# Patient Record
Sex: MALE | Race: WHITE | Employment: OTHER | URBAN - METROPOLITAN AREA
[De-identification: names, ages, dates, MRNs, and addresses within clinical notes are randomized per-mention and may not be internally consistent; named-entity substitution may affect disease eponyms.]

---

## 2017-03-15 PROBLEM — K40.90 LEFT INGUINAL HERNIA: Status: ACTIVE | Noted: 2017-03-15

## 2018-07-31 PROBLEM — G43.009 MIGRAINE WITHOUT AURA AND WITHOUT STATUS MIGRAINOSUS, NOT INTRACTABLE: Status: ACTIVE | Noted: 2018-07-31

## 2018-12-17 RX ORDER — SODIUM CHLORIDE 0.9 % (FLUSH) 0.9 %
5-10 SYRINGE (ML) INJECTION AS NEEDED
Status: CANCELLED | OUTPATIENT
Start: 2018-12-17

## 2018-12-17 RX ORDER — SODIUM CHLORIDE 0.9 % (FLUSH) 0.9 %
5-10 SYRINGE (ML) INJECTION EVERY 8 HOURS
Status: CANCELLED | OUTPATIENT
Start: 2018-12-17

## 2019-04-16 RX ORDER — SODIUM CHLORIDE 0.9 % (FLUSH) 0.9 %
5-40 SYRINGE (ML) INJECTION EVERY 8 HOURS
Status: CANCELLED | OUTPATIENT
Start: 2019-04-16

## 2019-04-16 RX ORDER — SODIUM CHLORIDE 0.9 % (FLUSH) 0.9 %
5-40 SYRINGE (ML) INJECTION AS NEEDED
Status: CANCELLED | OUTPATIENT
Start: 2019-04-16

## 2019-04-29 ENCOUNTER — ANESTHESIA EVENT (OUTPATIENT)
Dept: SURGERY | Age: 69
End: 2019-04-29
Payer: MEDICARE

## 2019-04-30 ENCOUNTER — ANESTHESIA (OUTPATIENT)
Dept: SURGERY | Age: 69
End: 2019-04-30
Payer: MEDICARE

## 2019-04-30 ENCOUNTER — HOSPITAL ENCOUNTER (OUTPATIENT)
Age: 69
Setting detail: OUTPATIENT SURGERY
Discharge: HOME | End: 2019-04-30
Attending: SURGERY | Admitting: SURGERY
Payer: MEDICARE

## 2019-04-30 VITALS
HEART RATE: 54 BPM | TEMPERATURE: 98.5 F | RESPIRATION RATE: 18 BRPM | WEIGHT: 147.9 LBS | HEIGHT: 71 IN | OXYGEN SATURATION: 98 % | BODY MASS INDEX: 20.71 KG/M2 | SYSTOLIC BLOOD PRESSURE: 112 MMHG | DIASTOLIC BLOOD PRESSURE: 62 MMHG

## 2019-04-30 DIAGNOSIS — K40.90 LEFT INGUINAL HERNIA: Primary | ICD-10-CM

## 2019-04-30 PROCEDURE — 77030002996 HC SUT SLK J&J -A: Performed by: SURGERY

## 2019-04-30 PROCEDURE — 76210000020 HC REC RM PH II FIRST 0.5 HR: Performed by: SURGERY

## 2019-04-30 PROCEDURE — 76010000162 HC OR TIME 1.5 TO 2 HR INTENSV-TIER 1: Performed by: SURGERY

## 2019-04-30 PROCEDURE — 77030037088 HC TUBE ENDOTRACH ORAL NSL COVD-A: Performed by: NURSE ANESTHETIST, CERTIFIED REGISTERED

## 2019-04-30 PROCEDURE — 77030018836 HC SOL IRR NACL ICUM -A: Performed by: SURGERY

## 2019-04-30 PROCEDURE — 74011000250 HC RX REV CODE- 250

## 2019-04-30 PROCEDURE — 76210000063 HC OR PH I REC FIRST 0.5 HR: Performed by: SURGERY

## 2019-04-30 PROCEDURE — 77030020782 HC GWN BAIR PAWS FLX 3M -B: Performed by: NURSE ANESTHETIST, CERTIFIED REGISTERED

## 2019-04-30 PROCEDURE — 77030039425 HC BLD LARYNG TRULITE DISP TELE -A: Performed by: NURSE ANESTHETIST, CERTIFIED REGISTERED

## 2019-04-30 PROCEDURE — 74011250637 HC RX REV CODE- 250/637: Performed by: ANESTHESIOLOGY

## 2019-04-30 PROCEDURE — 77030002986 HC SUT PROL J&J -A: Performed by: SURGERY

## 2019-04-30 PROCEDURE — 77030008467 HC STPLR SKN COVD -B: Performed by: SURGERY

## 2019-04-30 PROCEDURE — 74011250636 HC RX REV CODE- 250/636: Performed by: ANESTHESIOLOGY

## 2019-04-30 PROCEDURE — 74011250636 HC RX REV CODE- 250/636

## 2019-04-30 PROCEDURE — 74011250636 HC RX REV CODE- 250/636: Performed by: SURGERY

## 2019-04-30 PROCEDURE — 76060000034 HC ANESTHESIA 1.5 TO 2 HR: Performed by: SURGERY

## 2019-04-30 PROCEDURE — 77030012411 HC DRN WND CARD -A: Performed by: SURGERY

## 2019-04-30 PROCEDURE — C1781 MESH (IMPLANTABLE): HCPCS | Performed by: SURGERY

## 2019-04-30 PROCEDURE — 77030031139 HC SUT VCRL2 J&J -A: Performed by: SURGERY

## 2019-04-30 PROCEDURE — 77030033269 HC SLV COMPR SCD KNE2 CARD -B: Performed by: SURGERY

## 2019-04-30 PROCEDURE — 74011000250 HC RX REV CODE- 250: Performed by: SURGERY

## 2019-04-30 DEVICE — MESH HERN M W1.3XL1.6IN SYN POLY-4-HYDROXYBUTYRATE PLUG AND: Type: IMPLANTABLE DEVICE | Site: GROIN | Status: FUNCTIONAL

## 2019-04-30 RX ORDER — SODIUM CHLORIDE, SODIUM LACTATE, POTASSIUM CHLORIDE, CALCIUM CHLORIDE 600; 310; 30; 20 MG/100ML; MG/100ML; MG/100ML; MG/100ML
75 INJECTION, SOLUTION INTRAVENOUS CONTINUOUS
Status: DISCONTINUED | OUTPATIENT
Start: 2019-04-30 | End: 2019-04-30 | Stop reason: HOSPADM

## 2019-04-30 RX ORDER — LIDOCAINE HYDROCHLORIDE 10 MG/ML
0.1 INJECTION INFILTRATION; PERINEURAL AS NEEDED
Status: DISCONTINUED | OUTPATIENT
Start: 2019-04-30 | End: 2019-04-30 | Stop reason: HOSPADM

## 2019-04-30 RX ORDER — NEOSTIGMINE METHYLSULFATE 1 MG/ML
INJECTION INTRAVENOUS AS NEEDED
Status: DISCONTINUED | OUTPATIENT
Start: 2019-04-30 | End: 2019-04-30 | Stop reason: HOSPADM

## 2019-04-30 RX ORDER — ACETAMINOPHEN 500 MG
1000 TABLET ORAL ONCE
Status: COMPLETED | OUTPATIENT
Start: 2019-04-30 | End: 2019-04-30

## 2019-04-30 RX ORDER — DEXAMETHASONE SODIUM PHOSPHATE 4 MG/ML
INJECTION, SOLUTION INTRA-ARTICULAR; INTRALESIONAL; INTRAMUSCULAR; INTRAVENOUS; SOFT TISSUE AS NEEDED
Status: DISCONTINUED | OUTPATIENT
Start: 2019-04-30 | End: 2019-04-30 | Stop reason: HOSPADM

## 2019-04-30 RX ORDER — KETOROLAC TROMETHAMINE 30 MG/ML
INJECTION, SOLUTION INTRAMUSCULAR; INTRAVENOUS AS NEEDED
Status: DISCONTINUED | OUTPATIENT
Start: 2019-04-30 | End: 2019-04-30 | Stop reason: HOSPADM

## 2019-04-30 RX ORDER — NALOXONE HYDROCHLORIDE 0.4 MG/ML
0.2 INJECTION, SOLUTION INTRAMUSCULAR; INTRAVENOUS; SUBCUTANEOUS
Status: DISCONTINUED | OUTPATIENT
Start: 2019-04-30 | End: 2019-04-30 | Stop reason: HOSPADM

## 2019-04-30 RX ORDER — OXYCODONE HYDROCHLORIDE 5 MG/1
5 TABLET ORAL
Status: COMPLETED | OUTPATIENT
Start: 2019-04-30 | End: 2019-04-30

## 2019-04-30 RX ORDER — FENTANYL CITRATE 50 UG/ML
INJECTION, SOLUTION INTRAMUSCULAR; INTRAVENOUS AS NEEDED
Status: DISCONTINUED | OUTPATIENT
Start: 2019-04-30 | End: 2019-04-30 | Stop reason: HOSPADM

## 2019-04-30 RX ORDER — LIDOCAINE HYDROCHLORIDE 20 MG/ML
INJECTION, SOLUTION EPIDURAL; INFILTRATION; INTRACAUDAL; PERINEURAL AS NEEDED
Status: DISCONTINUED | OUTPATIENT
Start: 2019-04-30 | End: 2019-04-30 | Stop reason: HOSPADM

## 2019-04-30 RX ORDER — CEFAZOLIN SODIUM/WATER 2 G/20 ML
2 SYRINGE (ML) INTRAVENOUS ONCE
Status: COMPLETED | OUTPATIENT
Start: 2019-04-30 | End: 2019-04-30

## 2019-04-30 RX ORDER — SODIUM CHLORIDE 0.9 % (FLUSH) 0.9 %
5-40 SYRINGE (ML) INJECTION AS NEEDED
Status: DISCONTINUED | OUTPATIENT
Start: 2019-04-30 | End: 2019-04-30 | Stop reason: HOSPADM

## 2019-04-30 RX ORDER — ROCURONIUM BROMIDE 10 MG/ML
INJECTION, SOLUTION INTRAVENOUS AS NEEDED
Status: DISCONTINUED | OUTPATIENT
Start: 2019-04-30 | End: 2019-04-30 | Stop reason: HOSPADM

## 2019-04-30 RX ORDER — ONDANSETRON 2 MG/ML
INJECTION INTRAMUSCULAR; INTRAVENOUS AS NEEDED
Status: DISCONTINUED | OUTPATIENT
Start: 2019-04-30 | End: 2019-04-30 | Stop reason: HOSPADM

## 2019-04-30 RX ORDER — SODIUM CHLORIDE, SODIUM LACTATE, POTASSIUM CHLORIDE, CALCIUM CHLORIDE 600; 310; 30; 20 MG/100ML; MG/100ML; MG/100ML; MG/100ML
25 INJECTION, SOLUTION INTRAVENOUS CONTINUOUS
Status: DISCONTINUED | OUTPATIENT
Start: 2019-04-30 | End: 2019-04-30 | Stop reason: HOSPADM

## 2019-04-30 RX ORDER — MIDAZOLAM HYDROCHLORIDE 1 MG/ML
2 INJECTION, SOLUTION INTRAMUSCULAR; INTRAVENOUS ONCE
Status: DISCONTINUED | OUTPATIENT
Start: 2019-04-30 | End: 2019-04-30 | Stop reason: HOSPADM

## 2019-04-30 RX ORDER — EPHEDRINE SULFATE 50 MG/ML
INJECTION, SOLUTION INTRAVENOUS AS NEEDED
Status: DISCONTINUED | OUTPATIENT
Start: 2019-04-30 | End: 2019-04-30 | Stop reason: HOSPADM

## 2019-04-30 RX ORDER — SODIUM CHLORIDE 0.9 % (FLUSH) 0.9 %
5-40 SYRINGE (ML) INJECTION EVERY 8 HOURS
Status: DISCONTINUED | OUTPATIENT
Start: 2019-04-30 | End: 2019-04-30 | Stop reason: HOSPADM

## 2019-04-30 RX ORDER — HYDROMORPHONE HYDROCHLORIDE 2 MG/ML
0.5 INJECTION, SOLUTION INTRAMUSCULAR; INTRAVENOUS; SUBCUTANEOUS
Status: DISCONTINUED | OUTPATIENT
Start: 2019-04-30 | End: 2019-04-30 | Stop reason: HOSPADM

## 2019-04-30 RX ORDER — FENTANYL CITRATE 50 UG/ML
100 INJECTION, SOLUTION INTRAMUSCULAR; INTRAVENOUS ONCE
Status: DISCONTINUED | OUTPATIENT
Start: 2019-04-30 | End: 2019-04-30 | Stop reason: HOSPADM

## 2019-04-30 RX ORDER — PROPOFOL 10 MG/ML
INJECTION, EMULSION INTRAVENOUS AS NEEDED
Status: DISCONTINUED | OUTPATIENT
Start: 2019-04-30 | End: 2019-04-30 | Stop reason: HOSPADM

## 2019-04-30 RX ORDER — MIDAZOLAM HYDROCHLORIDE 1 MG/ML
2 INJECTION, SOLUTION INTRAMUSCULAR; INTRAVENOUS
Status: DISCONTINUED | OUTPATIENT
Start: 2019-04-30 | End: 2019-04-30 | Stop reason: HOSPADM

## 2019-04-30 RX ORDER — ONDANSETRON 2 MG/ML
4 INJECTION INTRAMUSCULAR; INTRAVENOUS
Status: DISCONTINUED | OUTPATIENT
Start: 2019-04-30 | End: 2019-04-30 | Stop reason: HOSPADM

## 2019-04-30 RX ORDER — HYDROCODONE BITARTRATE AND ACETAMINOPHEN 5; 325 MG/1; MG/1
1 TABLET ORAL
Qty: 25 TAB | Refills: 0 | Status: SHIPPED | OUTPATIENT
Start: 2019-04-30 | End: 2019-05-03

## 2019-04-30 RX ORDER — BUPIVACAINE HYDROCHLORIDE AND EPINEPHRINE 5; 5 MG/ML; UG/ML
INJECTION, SOLUTION EPIDURAL; INTRACAUDAL; PERINEURAL AS NEEDED
Status: DISCONTINUED | OUTPATIENT
Start: 2019-04-30 | End: 2019-04-30 | Stop reason: HOSPADM

## 2019-04-30 RX ORDER — GLYCOPYRROLATE 0.2 MG/ML
INJECTION INTRAMUSCULAR; INTRAVENOUS AS NEEDED
Status: DISCONTINUED | OUTPATIENT
Start: 2019-04-30 | End: 2019-04-30 | Stop reason: HOSPADM

## 2019-04-30 RX ORDER — HYDROCODONE BITARTRATE AND ACETAMINOPHEN 5; 325 MG/1; MG/1
1 TABLET ORAL
Qty: 40 TAB | Refills: 0 | Status: SHIPPED | OUTPATIENT
Start: 2019-04-30 | End: 2019-04-30 | Stop reason: SDUPTHER

## 2019-04-30 RX ORDER — NALOXONE HYDROCHLORIDE 0.4 MG/ML
0.2 INJECTION, SOLUTION INTRAMUSCULAR; INTRAVENOUS; SUBCUTANEOUS AS NEEDED
Status: DISCONTINUED | OUTPATIENT
Start: 2019-04-30 | End: 2019-04-30 | Stop reason: HOSPADM

## 2019-04-30 RX ADMIN — NEOSTIGMINE METHYLSULFATE 3 MG: 1 INJECTION INTRAVENOUS at 13:51

## 2019-04-30 RX ADMIN — SODIUM CHLORIDE, SODIUM LACTATE, POTASSIUM CHLORIDE, AND CALCIUM CHLORIDE: 600; 310; 30; 20 INJECTION, SOLUTION INTRAVENOUS at 12:50

## 2019-04-30 RX ADMIN — ACETAMINOPHEN 1000 MG: 500 TABLET, FILM COATED ORAL at 11:04

## 2019-04-30 RX ADMIN — DEXAMETHASONE SODIUM PHOSPHATE 4 MG: 4 INJECTION, SOLUTION INTRA-ARTICULAR; INTRALESIONAL; INTRAMUSCULAR; INTRAVENOUS; SOFT TISSUE at 13:13

## 2019-04-30 RX ADMIN — PROPOFOL 130 MG: 10 INJECTION, EMULSION INTRAVENOUS at 12:51

## 2019-04-30 RX ADMIN — OXYCODONE HYDROCHLORIDE 5 MG: 5 TABLET ORAL at 14:52

## 2019-04-30 RX ADMIN — KETOROLAC TROMETHAMINE 15 MG: 30 INJECTION, SOLUTION INTRAMUSCULAR; INTRAVENOUS at 13:50

## 2019-04-30 RX ADMIN — Medication 2 G: at 12:58

## 2019-04-30 RX ADMIN — SODIUM CHLORIDE, SODIUM LACTATE, POTASSIUM CHLORIDE, AND CALCIUM CHLORIDE 25 ML/HR: 600; 310; 30; 20 INJECTION, SOLUTION INTRAVENOUS at 11:05

## 2019-04-30 RX ADMIN — ONDANSETRON 4 MG: 2 INJECTION INTRAMUSCULAR; INTRAVENOUS at 13:13

## 2019-04-30 RX ADMIN — GLYCOPYRROLATE 0.4 MG: 0.2 INJECTION INTRAMUSCULAR; INTRAVENOUS at 13:51

## 2019-04-30 RX ADMIN — EPHEDRINE SULFATE 10 MG: 50 INJECTION, SOLUTION INTRAVENOUS at 13:37

## 2019-04-30 RX ADMIN — ROCURONIUM BROMIDE 50 MG: 10 INJECTION, SOLUTION INTRAVENOUS at 12:52

## 2019-04-30 RX ADMIN — EPHEDRINE SULFATE 10 MG: 50 INJECTION, SOLUTION INTRAVENOUS at 13:25

## 2019-04-30 RX ADMIN — LIDOCAINE HYDROCHLORIDE 80 MG: 20 INJECTION, SOLUTION EPIDURAL; INFILTRATION; INTRACAUDAL; PERINEURAL at 12:51

## 2019-04-30 RX ADMIN — FENTANYL CITRATE 50 MCG: 50 INJECTION, SOLUTION INTRAMUSCULAR; INTRAVENOUS at 12:50

## 2019-04-30 NOTE — ANESTHESIA PREPROCEDURE EVALUATION
Relevant Problems No relevant active problems Anesthetic History No history of anesthetic complications Review of Systems / Medical History Patient summary reviewed and pertinent labs reviewed Pulmonary Asthma : well controlled Neuro/Psych Headaches (Migraines) Cardiovascular Exercise tolerance: >4 METS Comments: Denies CP, SOB or changes in functional status GI/Hepatic/Renal 
Within defined limits Endo/Other Within defined limits Other Findings Physical Exam 
 
Airway Mallampati: II 
TM Distance: 4 - 6 cm Neck ROM: normal range of motion Mouth opening: Normal 
 
 Cardiovascular Rhythm: regular Rate: normal 
 
 
 
 Dental 
No notable dental hx Pulmonary Breath sounds clear to auscultation Abdominal 
GI exam deferred Other Findings Anesthetic Plan ASA: 2 Anesthesia type: general 
 
 
 
 
Induction: Intravenous Anesthetic plan and risks discussed with: Patient

## 2019-04-30 NOTE — ANESTHESIA POSTPROCEDURE EVALUATION
Procedure(s): HERNIA INGUINAL REPAIR LEFT WITH MESH. general 
 
Anesthesia Post Evaluation Multimodal analgesia: multimodal analgesia used between 6 hours prior to anesthesia start to PACU discharge Patient location during evaluation: bedside Patient participation: complete - patient participated Level of consciousness: awake and alert Pain score: 1 Pain management: adequate Airway patency: patent Anesthetic complications: no 
Cardiovascular status: acceptable Respiratory status: acceptable Hydration status: acceptable Comments: Pt doing well. Ok to d/c home. Post anesthesia nausea and vomiting:  none Vitals Value Taken Time /56 4/30/2019  2:51 PM  
Temp 36.6 °C (97.8 °F) 4/30/2019  2:23 PM  
Pulse 46 4/30/2019  2:51 PM  
Resp 18 4/30/2019  2:46 PM  
SpO2 97 % 4/30/2019  2:51 PM

## 2019-04-30 NOTE — H&P
Surgery History and Physical    Subjective:      Patricia Koehler is a 78 y.o. male who presents for An open left inguinal hernia repair with mesh. Past Medical History:   Diagnosis Date    Asthma     as a child    Migraine without aura and without status migrainosus, not intractable 2018     Past Surgical History:   Procedure Laterality Date    HX COLONOSCOPY      HX OTHER SURGICAL      L Hydrocelectomy        Family History   Problem Relation Age of Onset    Cancer Mother         Lung    Heart Disease Mother     Diabetes Father     Heart Disease Father     Hypertension Father     Diabetes Brother     Stroke Brother      Social History     Tobacco Use    Smoking status: Never Smoker    Smokeless tobacco: Never Used   Substance Use Topics    Alcohol use: No      Prior to Admission medications    Medication Sig Start Date End Date Taking? Authorizing Provider   SUMAtriptan (IMITREX) 100 mg tablet TAKE ONE TABLET AT ONSET OF HEADACHE, MAY REPEAT IN 2 HOURS IF NO BETTER. MAX 2 TABLETS EVERY 24 HOURS (FOR MIGRAINE) 19   Brothers, Olive LEAL, DO      No Known Allergies    Review of Systems   All other systems reviewed and are negative. Objective:     Patient Vitals for the past 8 hrs:   BP Temp Pulse Resp SpO2 Height Weight   19 1103 141/69 98.3 °F (36.8 °C) (!) 52 16 99 % -- --   19 1053 -- -- -- -- -- 5' 11\" (1.803 m) 147 lb 14.4 oz (67.1 kg)       Temp (24hrs), Av.3 °F (36.8 °C), Min:98.3 °F (36.8 °C), Max:98.3 °F (36.8 °C)      Physical Exam   Constitutional: He is oriented to person, place, and time. He appears well-developed and well-nourished. No distress. HENT:   Head: Normocephalic and atraumatic. Eyes: Conjunctivae and EOM are normal. Pupils are equal, round, and reactive to light. Neck: Normal range of motion. Neck supple. Cardiovascular: Normal rate, regular rhythm and normal heart sounds.    Pulmonary/Chest: Effort normal and breath sounds normal. No respiratory distress. He has no wheezes. Abdominal: Soft. Bowel sounds are normal. He exhibits no distension and no mass. There is no tenderness. There is no rebound and no guarding. +[facility]   Musculoskeletal: Normal range of motion. Neurological: He is alert and oriented to person, place, and time. Skin: Skin is warm and dry. He is not diaphoretic. Psychiatric: He has a normal mood and affect. His behavior is normal. Judgment and thought content normal.       Assessment:     [facility]    Plan:     Discussed the risk of surgery including but not limited to Bleeding, infection, hematoma, nerve injury, recurrence,  and the risks of general anesthetic. The patient understands the risks; any and all questions were answered to the patient's satisfaction. Proceed with open left inguinal hernia repair with mesh.

## 2019-04-30 NOTE — BRIEF OP NOTE
BRIEF OPERATIVE NOTE    Date of Procedure: 4/30/2019   Preoperative Diagnosis: Left inguinal hernia [K40.90]  Postoperative Diagnosis: Left inguinal hernia [K40.90]    Procedure(s): HERNIA INGUINAL REPAIR LEFT WITH MESH  Surgeon(s) and Role:     * Duane Brody MD - Primary         Surgical Assistant: GURERERO Parker      Surgical Staff:  Collis BanksReginold Cooks, RN  Physician Assistant: GUERRERO Oliver  Scrub Tech-1: [employer]  Event Time In Time Out   Incision Start 1306    Incision Close 1353      Anesthesia: General   Estimated Blood Loss: minimal  Specimens: * No specimens in log *   Findings: see operative report   Complications: none  Implants:   Implant Name Type Inv.  Item Serial No.  Lot No. LRB No. Used Action   MESH RO PLUG/PTCH MED -- PHASIX - [id number]  MESH RO PLUG/PTCH MED -- PHASIX   DWAYNEPAPO [id number] 1 Implanted

## 2019-04-30 NOTE — DISCHARGE INSTRUCTIONS
Discharge Instructions/Follow-up Plans:   MD Instructions:     Follow-up with Dottie Valdes, Physician Assistant for Dr. Juan Bocanegra in 1 week. Keep incisions clean and dry, may remain uncovered. Do not apply lotions, creams or ointments to incisions.     Diet - as tolerated - Soft foods diet. Activity - ambulate - as tolerated - no heavy lifting >10lb. May shower - no tub baths or soaking/submerging.     No driving while taking narcotics. Do not drink alcohol while taking narcotics. Resume other home medications.      If problems or questions arise, please call our office at [phone].     After general anesthesia or intravenous sedation, for 24 hours or while taking prescription Narcotics:  · Limit your activities  · A responsible adult needs to be with you for the next 24 hours  · Do not drive and operate hazardous machinery  · Do not make important personal or business decisions  · Do  not drink alcoholic beverages  · If you have not urinated within 8 hours after discharge, please contact your surgeon on call. *  Please give a list of your current medications to your Primary Care Provider. *  Please update this list whenever your medications are discontinued, doses are      changed, or new medications (including over-the-counter products) are added. *  Please carry medication information at all times in case of emergency situations. These are general instructions for a healthy lifestyle:  No smoking/ No tobacco products/ Avoid exposure to second hand smoke  Surgeon General's Warning:  Quitting smoking now greatly reduces serious risk to your health.   Obesity, smoking, and sedentary lifestyle greatly increases your risk for illness  A healthy diet, regular physical exercise & weight monitoring are important for maintaining a healthy lifestyle    You may be retaining fluid if you have a history of heart failure or if you experience any of the following symptoms:  Weight gain of 3 pounds or more overnight or 5 pounds in a week, increased swelling in our hands or feet or shortness of breath while lying flat in bed. Please call your doctor as soon as you notice any of these symptoms; do not wait until your next office visit. Recognize signs and symptoms of STROKE:  F-face looks uneven  A-arms unable to move or move unevenly  S-speech slurred or non-existent  T-time-call 911 as soon as signs and symptoms begin-DO NOT go       Back to bed or wait to see if you get better-TIME IS BRAIN.

## 2019-05-01 NOTE — OP NOTES
[facility]  OPERATIVE REPORT    Name:  Richard Lezama  MR#:  [id number]  :  1950  ACCOUNT #:  [de-identified]  DATE OF SERVICE:  2019      PREOPERATIVE DIAGNOSIS:  Left inguinal hernia. POSTOPERATIVE DIAGNOSIS:  Left indirect inguinal hernia. PROCEDURE PERFORMED:  Left inguinal hernia repair with mesh. SURGEON:  Olive Cárdenas MD    ASSISTANT:  GUERRERO Dutta    ANESTHESIA:  General.    COMPLICATIONS:  None. COUNTS:  Correct. SPECIMENS REMOVED:  None. IMPLANTS:  One Phasix bioabsorbable plug and bioabsorbable patch overlay. ESTIMATED BLOOD LOSS:  Minimal.    DESCRIPTION OF PROCEDURE:  After the patient was asleep, the left groin incision was made. Dissection carried down through Adalberto's fascia. External oblique aponeurosis opened in direction with its fibers. The iliac nerve was identified and spared. The cord structures were isolated. Indirect inguinal hernia was visualized and dissected free. This was reduced. A [place]ridge bioabsorbable plug was soaked in 0.5% Marcaine with Epinephrine and inserted into the defect. The fronds were trimmed and interrupted #0 Prolene was used to secure this in place very loosely. Phasix bioabsorbable plug and bioabsorbable patch overlay were then placed. This was wrapped around the cord and secured in place with interrupted #0 Prolene; #0 Prolene used to close the external oblique aponeurosis. A 3-0 Vicryl was used to close subcutaneous tissue. Skin was closed with staples. The patient tolerated the procedure well.       Ann Lutz MD      [email or web address]  D:  2019 13:51  T:  2019 19:01  JOB #:  [id number]

## 2022-03-19 PROBLEM — G43.009 MIGRAINE WITHOUT AURA AND WITHOUT STATUS MIGRAINOSUS, NOT INTRACTABLE: Status: ACTIVE | Noted: 2018-07-31

## 2022-03-20 PROBLEM — K40.90 LEFT INGUINAL HERNIA: Status: ACTIVE | Noted: 2017-03-15

## 2022-11-29 DIAGNOSIS — Z00.00 ENCOUNTER FOR GENERAL ADULT MEDICAL EXAMINATION WITHOUT ABNORMAL FINDINGS: ICD-10-CM

## 2022-11-29 DIAGNOSIS — Z12.5 ENCOUNTER FOR SCREENING FOR MALIGNANT NEOPLASM OF PROSTATE: Primary | ICD-10-CM

## 2022-11-29 DIAGNOSIS — Z13.220 ENCOUNTER FOR SCREENING FOR LIPOID DISORDERS: ICD-10-CM

## 2022-12-16 ENCOUNTER — OFFICE VISIT (OUTPATIENT)
Dept: INTERNAL MEDICINE CLINIC | Facility: CLINIC | Age: 72
End: 2022-12-16

## 2022-12-16 VITALS
HEART RATE: 56 BPM | BODY MASS INDEX: 20.44 KG/M2 | HEIGHT: 71 IN | DIASTOLIC BLOOD PRESSURE: 70 MMHG | WEIGHT: 146 LBS | OXYGEN SATURATION: 100 % | SYSTOLIC BLOOD PRESSURE: 112 MMHG | RESPIRATION RATE: 17 BRPM

## 2022-12-16 DIAGNOSIS — Z12.5 ENCOUNTER FOR SCREENING FOR MALIGNANT NEOPLASM OF PROSTATE: ICD-10-CM

## 2022-12-16 DIAGNOSIS — Z13.220 ENCOUNTER FOR SCREENING FOR LIPOID DISORDERS: ICD-10-CM

## 2022-12-16 DIAGNOSIS — R39.14 BENIGN PROSTATIC HYPERPLASIA WITH INCOMPLETE BLADDER EMPTYING: ICD-10-CM

## 2022-12-16 DIAGNOSIS — Z00.00 MEDICARE ANNUAL WELLNESS VISIT, SUBSEQUENT: ICD-10-CM

## 2022-12-16 DIAGNOSIS — N40.1 BENIGN PROSTATIC HYPERPLASIA WITH INCOMPLETE BLADDER EMPTYING: ICD-10-CM

## 2022-12-16 DIAGNOSIS — K59.01 SLOW TRANSIT CONSTIPATION: Primary | ICD-10-CM

## 2022-12-16 DIAGNOSIS — G43.009 MIGRAINE WITHOUT AURA AND WITHOUT STATUS MIGRAINOSUS, NOT INTRACTABLE: ICD-10-CM

## 2022-12-16 LAB
ALBUMIN SERPL-MCNC: 3.8 G/DL (ref 3.2–4.6)
ALBUMIN/GLOB SERPL: 1.5 {RATIO} (ref 0.4–1.6)
ALP SERPL-CCNC: 57 U/L (ref 50–136)
ALT SERPL-CCNC: 37 U/L (ref 12–65)
ANION GAP SERPL CALC-SCNC: 2 MMOL/L (ref 2–11)
AST SERPL-CCNC: 18 U/L (ref 15–37)
BILIRUB SERPL-MCNC: 0.5 MG/DL (ref 0.2–1.1)
BUN SERPL-MCNC: 23 MG/DL (ref 8–23)
CALCIUM SERPL-MCNC: 9.4 MG/DL (ref 8.3–10.4)
CHLORIDE SERPL-SCNC: 107 MMOL/L (ref 101–110)
CHOLEST SERPL-MCNC: 161 MG/DL
CO2 SERPL-SCNC: 29 MMOL/L (ref 21–32)
CREAT SERPL-MCNC: 1 MG/DL (ref 0.8–1.5)
ERYTHROCYTE [DISTWIDTH] IN BLOOD BY AUTOMATED COUNT: 12.8 % (ref 11.9–14.6)
GLOBULIN SER CALC-MCNC: 2.6 G/DL (ref 2.8–4.5)
GLUCOSE SERPL-MCNC: 105 MG/DL (ref 65–100)
HCT VFR BLD AUTO: 46.8 % (ref 41.1–50.3)
HDLC SERPL-MCNC: 47 MG/DL (ref 40–60)
HDLC SERPL: 3.4 {RATIO}
HGB BLD-MCNC: 15.5 G/DL (ref 13.6–17.2)
LDLC SERPL CALC-MCNC: 102.8 MG/DL
MCH RBC QN AUTO: 30.2 PG (ref 26.1–32.9)
MCHC RBC AUTO-ENTMCNC: 33.1 G/DL (ref 31.4–35)
MCV RBC AUTO: 91.2 FL (ref 82–102)
NRBC # BLD: 0 K/UL (ref 0–0.2)
PLATELET # BLD AUTO: 305 K/UL (ref 150–450)
PMV BLD AUTO: 9.1 FL (ref 9.4–12.3)
POTASSIUM SERPL-SCNC: 4.5 MMOL/L (ref 3.5–5.1)
PROT SERPL-MCNC: 6.4 G/DL (ref 6.3–8.2)
RBC # BLD AUTO: 5.13 M/UL (ref 4.23–5.6)
SODIUM SERPL-SCNC: 138 MMOL/L (ref 133–143)
TRIGL SERPL-MCNC: 56 MG/DL (ref 35–150)
VLDLC SERPL CALC-MCNC: 11.2 MG/DL (ref 6–23)
WBC # BLD AUTO: 8 K/UL (ref 4.3–11.1)

## 2022-12-16 RX ORDER — SUMATRIPTAN 100 MG/1
TABLET, FILM COATED ORAL
Qty: 27 TABLET | Refills: 3 | Status: SHIPPED | OUTPATIENT
Start: 2022-12-16

## 2022-12-16 RX ORDER — TAMSULOSIN HYDROCHLORIDE 0.4 MG/1
0.4 CAPSULE ORAL DAILY
Qty: 90 CAPSULE | Refills: 3 | Status: SHIPPED | OUTPATIENT
Start: 2022-12-16

## 2022-12-16 ASSESSMENT — PATIENT HEALTH QUESTIONNAIRE - PHQ9
SUM OF ALL RESPONSES TO PHQ QUESTIONS 1-9: 0
1. LITTLE INTEREST OR PLEASURE IN DOING THINGS: 0
2. FEELING DOWN, DEPRESSED OR HOPELESS: 0
SUM OF ALL RESPONSES TO PHQ QUESTIONS 1-9: 0
SUM OF ALL RESPONSES TO PHQ9 QUESTIONS 1 & 2: 0
SUM OF ALL RESPONSES TO PHQ QUESTIONS 1-9: 0
SUM OF ALL RESPONSES TO PHQ QUESTIONS 1-9: 0

## 2022-12-16 ASSESSMENT — LIFESTYLE VARIABLES
HOW OFTEN DO YOU HAVE A DRINK CONTAINING ALCOHOL: NEVER
HOW MANY STANDARD DRINKS CONTAINING ALCOHOL DO YOU HAVE ON A TYPICAL DAY: PATIENT DOES NOT DRINK

## 2022-12-16 NOTE — PROGRESS NOTES
Medicare Annual Wellness Visit    Amber Lemus is here for Medicare AWV    Assessment & Plan   Medicare annual wellness visit, subsequent      Recommendations for Preventive Services Due: see orders and patient instructions/AVS.  Recommended screening schedule for the next 5-10 years is provided to the patient in written form: see Patient Instructions/AVS.     Return for Medicare Annual Wellness Visit in 1 year. Patient's complete Health Risk Assessment and screening values have been reviewed and are found in Flowsheets. The following problems were reviewed today and where indicated follow up appointments were made and/or referrals ordered. Positive Risk Factor Screenings with Interventions:                 Weight and Activity:  Physical Activity: Inactive    Days of Exercise per Week: 0 days    Minutes of Exercise per Session: 0 min     On average, how many days per week do you engage in moderate to strenuous exercise (like a brisk walk)?: 0 days  Have you lost any weight without trying in the past 3 months?: No  Body mass index: 20.36    Encouraged healthy eating and exercise. Dentist Screen:  Have you seen the dentist within the past year?: (!) No    Intervention:  Encouraged dental follow up. Safety:  Do you have either shower bars, grab bars, non-slip mats or non-slip surfaces in your shower or bathtub?: (!) No  Interventions:  Appropriate safety interventions as indicated. Advanced Directives:  Do you have a Living Will?: (!) No    Encouraged living will planning            Objective   Vitals:    12/16/22 0958   BP: 112/70   Site: Left Upper Arm   Position: Sitting   Pulse: 56   Resp: 17   SpO2: 100%   Weight: 146 lb (66.2 kg)   Height: 5' 11\" (1.803 m)      Body mass index is 20.36 kg/m². No Known Allergies  Prior to Visit Medications    Medication Sig Taking?  Authorizing Provider   SUMAtriptan (IMITREX) 100 MG tablet Take one at onset of HA, may repeat in 2hrs if no better. Max 2tabs/24hrs.  Indications: a migraine headache Yes Nabil Nava DO   tamsulosin (FLOMAX) 0.4 MG capsule Take 1 capsule by mouth daily Yes Marlene Jeffrey DO       CareTeam (Including outside providers/suppliers regularly involved in providing care):   Patient Care Team:  Marlene Jeffrey DO as PCP - General  Marlene Jeffrey DO as PCP - [facility] Empaneled Provider     Reviewed and updated this visit:  Tobacco  Allergies  Meds  Problems  Med Hx  Surg Hx  Soc Hx  Fam Hx

## 2022-12-16 NOTE — PATIENT INSTRUCTIONS
Learning About Being Active as an Older Adult  Why is being active important as you get older? Being active is one of the best things you can do for your health. And it's never too late to start. Being active--or getting active, if you aren't already--has definite benefits. It can:  Give you more energy,  Keep your mind sharp. Improve balance to reduce your risk of falls. Help you manage chronic illness with fewer medicines. No matter how old you are, how fit you are, or what health problems you have, there is a form of activity that will work for you. And the more physical activity you can do, the better your overall health will be. What kinds of activity can help you stay healthy? Being more active will make your daily activities easier. Physical activity includes planned exercise and things you do in daily life. There are four types of activity:  Aerobic. Doing aerobic activity makes your heart and lungs strong. Includes walking, dancing, and gardening. Aim for at least 2½ hours spread throughout the week. It improves your energy and can help you sleep better. Muscle-strengthening. This type of activity can help maintain muscle and strengthen bones. Includes climbing stairs, using resistance bands, and lifting or carrying heavy loads. Aim for at least twice a week. It can help protect the knees and other joints. Stretching. Stretching gives you better range of motion in joints and muscles. Includes upper arm stretches, calf stretches, and gentle yoga. Aim for at least twice a week, preferably after your muscles are warmed up from other activities. It can help you function better in daily life. Balancing. This helps you stay coordinated and have good posture. Includes heel-to-toe walking, sherlyn chi, and certain types of yoga. Aim for at least 3 days a week. It can reduce your risk of falling.   Even if you have a hard time meeting the recommendations, it's better to be more active than less active. All activity done in each category counts toward your weekly total. You'd be surprised how daily things like carrying groceries, keeping up with grandchildren, and taking the stairs can add up. What keeps you from being active? If you've had a hard time being more active, you're not alone. Maybe you remember being able to do more. Or maybe you've never thought of yourself as being active. It's frustrating when you can't do the things you want. Being more active can help. What's holding you back? Getting started. Have a goal, but break it into easy tasks. Small steps build into big accomplishments. Staying motivated. If you feel like skipping your activity, remember your goal. Maybe you want to move better and stay independent. Every activity gets you one step closer. Not feeling your best.  Start with 5 minutes of an activity you enjoy. Prove to yourself you can do it. As you get comfortable, increase your time. You may not be where you want to be. But you're in the process of getting there. Everyone starts somewhere. How can you find safe ways to stay active? Talk with your doctor about any physical challenges you're facing. Make a plan with your doctor if you have a health problem or aren't sure how to get started with activity. If you're already active, ask your doctor if there is anything you should change to stay safe as your body and health change. If you tend to feel dizzy after you take medicine, avoid activity at that time. Try being active before you take your medicine. This will reduce your risk of falls. If you plan to be active at home, make sure to clear your space before you get started. Remove things like TV cords, coffee tables, and throw rugs. It's safest to have plenty of space to move freely. The key to getting more active is to take it slow and steady. Try to improve only a little bit at a time.  Pick just one area to improve on at first. And if an activity hurts, stop and talk to your doctor. Where can you learn more? Go to http://www.nayak.com/ and enter P600 to learn more about \"Learning About Being Active as an Older Adult. \"  Current as of: October 10, 2022               Content Version: 13.5  © 9411-7067 Healthwise, Incorporated. Care instructions adapted under license by [facility]. If you have questions about a medical condition or this instruction, always ask your healthcare professional. [place] [address] any warranty or liability for your use of this information. Learning About Dental Care for Older Adults  Dental care for older adults: Overview  Dental care for older people is much the same as for younger adults. But older adults do have concerns that younger adults do not. Older adults may have problems with gum disease and decay on the roots of their teeth. They may need missing teeth replaced or broken fillings fixed. Or they may have dentures that need to be cared for. Some older adults may have trouble holding a toothbrush. You can help remind the person you are caring for to brush and floss their teeth or to clean their dentures. In some cases, you may need to do the brushing and other dental care tasks. People who have trouble using their hands or who have dementia may need this extra help. How can you help with dental care? Normal dental care  To keep the teeth and gums healthy:  Brush the teeth with fluoride toothpaste twice a day--in the morning and at night--and floss at least once a day. Plaque can quickly build up on the teeth of older adults. Watch for the signs of gum disease. These signs include gums that bleed after brushing or after eating hard foods, such as apples. See a dentist regularly. Many experts recommend checkups every 6 months. Keep the dentist up to date on any new medications the person is taking.   Encourage a balanced diet that includes whole grains, vegetables, and fruits, and that is low in saturated fat and sodium. Encourage the person you're caring for not to use tobacco products. They can affect dental and general health. Many older adults have a fixed income and feel that they can't afford dental care. But most towns and [facility] have programs in which dentists help older adults by lowering fees. Contact your area's public health offices or  for information about dental care in your area. Using a toothbrush  Older adults with arthritis sometimes have trouble brushing their teeth because they can't easily hold the toothbrush. Their hands and fingers may be stiff, painful, or weak. If this is the case, you can: Offer an electric toothbrush. Enlarge the handle of a non-electric toothbrush by wrapping a sponge, an elastic bandage, or adhesive tape around it. Push the toothbrush handle through a ball made of rubber or soft foam.  Make the handle longer and thicker by taping Popsicle sticks or tongue depressors to it. You may also be able to buy special toothbrushes, toothpaste dispensers, and floss holders. Your doctor may recommend a soft-bristle toothbrush if the person you care for bleeds easily. Bleeding can happen because of a health problem or from certain medicines. A toothpaste for sensitive teeth may help if the person you care for has sensitive teeth. How do you brush and floss someone's teeth? If the person you are caring for has a hard time cleaning their teeth on their own, you may need to brush and floss their teeth for them. It may be easiest to have the person sit and face away from you, and to sit or stand behind them. That way you can steady their head against your arm as you reach around to floss and brush their teeth. Choose a place that has good lighting and is comfortable for both of you. Before you begin, gather your supplies. You will need gloves, floss, a toothbrush, and a container to hold water if you are not near a sink.  Wash and dry your hands well and put on gloves. Start by flossing:  Gently work a piece of floss between each of the teeth toward the gums. A plastic flossing tool may make this easier, and they are available at most [facility]. Curve the floss around each tooth into a U-shape and gently slide it under the gum line. Move the floss firmly up and down several times to scrape off the plaque. After you've finished flossing, throw away the used floss and begin brushing:  Wet the brush and apply toothpaste. Place the brush at a 45-degree angle where the teeth meet the gums. Press firmly, and move the brush in small circles over the surface of the teeth. Be careful not to brush too hard. Vigorous brushing can make the gums pull away from the teeth and can scratch the tooth enamel. Brush all surfaces of the teeth, on the tongue side and on the cheek side. Pay special attention to the front teeth and all surfaces of the back teeth. Brush chewing surfaces with short back-and-forth strokes. After you've finished, help the person rinse the remaining toothpaste from their mouth. Where can you learn more? Go to http://www.woods.com/ and enter F944 to learn more about \"Learning About Dental Care for Older Adults. \"  Current as of: June 16, 2022               Content Version: 13.5  © 9111-1612 Healthwise, Incorporated. Care instructions adapted under license by [facility] ([facility]). If you have questions about a medical condition or this instruction, always ask your healthcare professional. [place] [address] any warranty or liability for your use of this information. Advance Directives: Care Instructions  Overview  An advance directive is a legal way to state your wishes at the end of your life. It tells your family and your doctor what to do if you can't say what you want. There are two main types of advance directives. You can change them any time your wishes change. Living will.   This form tells your family and your doctor your wishes about life support and other treatment. The form is also called a declaration. Medical power of . This form lets you name a person to make treatment decisions for you when you can't speak for yourself. This person is called a health care agent (health care proxy, health care surrogate). The form is also called a durable power of  for health care. If you do not have an advance directive, decisions about your medical care may be made by a family member, or by a doctor or a  who doesn't know you. It may help to think of an advance directive as a gift to the people who care for you. If you have one, they won't have to make tough decisions by themselves. For more information, including forms for your state, see the [employer] website (www.Ring[employer]TVo.org/planning/advance-directives/). Follow-up care is a key part of your treatment and safety. Be sure to make and go to all appointments, and call your doctor if you are having problems. It's also a good idea to know your test results and keep a list of the medicines you take. What should you include in an advance directive? Many states have a unique advance directive form. (It may ask you to address specific issues.) Or you might use a universal form that's approved by many states. If your form doesn't tell you what to address, it may be hard to know what to include in your advance directive. Use the questions below to help you get started. Who do you want to make decisions about your medical care if you are not able to? What life-support measures do you want if you have a serious illness that gets worse over time or can't be cured? What are you most afraid of that might happen? (Maybe you're afraid of having pain, losing your independence, or being kept alive by machines.)  Where would you prefer to die? (Your home? A hospital? A nursing home?)  Do you want to donate your organs when you die?   Do you want certain Yazidism practices performed before you die? When should you call for help? Be sure to contact your doctor if you have any questions. Where can you learn more? Go to http://www.nayak.com/ and enter R264 to learn more about \"Advance Directives: Care Instructions. \"  Current as of: June 16, 2022               Content Version: 13.5  © 9477-0736 [employer]. Care instructions adapted under license by [facility] ([facility]). If you have questions about a medical condition or this instruction, always ask your healthcare professional. [address] any warranty or liability for your use of this information. Personalized Preventive Plan for James Files - 12/16/2022  Medicare offers a range of preventive health benefits. Some of the tests and screenings are paid in full while other may be subject to a deductible, co-insurance, and/or copay. Some of these benefits include a comprehensive review of your medical history including lifestyle, illnesses that may run in your family, and various assessments and screenings as appropriate. After reviewing your medical record and screening and assessments performed today your provider may have ordered immunizations, labs, imaging, and/or referrals for you. A list of these orders (if applicable) as well as your Preventive Care list are included within your After Visit Summary for your review. Other Preventive Recommendations:    A preventive eye exam performed by an eye specialist is recommended every 1-2 years to screen for glaucoma; cataracts, macular degeneration, and other eye disorders. A preventive dental visit is recommended every 6 months. Try to get at least 150 minutes of exercise per week or 10,000 steps per day on a pedometer . Order or download the FREE \"Exercise & Physical Activity: Your Everyday Guide\" from [employer] on Aging.  Call 6-613.241.1247 or search [facility] on Aging online. You need 5276-1006 mg of calcium and 6184-1437 IU of vitamin D per day. It is possible to meet your calcium requirement with diet alone, but a vitamin D supplement is usually necessary to meet this goal.  When exposed to the sun, use a sunscreen that protects against both UVA and UVB radiation with an SPF of 30 or greater. Reapply every 2 to 3 hours or after sweating, drying off with a towel, or swimming. Always wear a seat belt when traveling in a car. Always wear a helmet when riding a bicycle or motorcycle.

## 2022-12-16 NOTE — PROGRESS NOTES
Jitendra Garcia DO  Diplomate of the [employer] of [address] [place] [address] Internal Medicine      Rubi Clay (: 1950) is a 67 y.o. male, here for evaluation of the following chief complaint(s):  Medicare AWV       ASSESSMENT/PLAN:  1. Slow transit constipation  2. Benign prostatic hyperplasia with incomplete bladder emptying  -     tamsulosin (FLOMAX) 0.4 MG capsule; Take 1 capsule by mouth daily, Disp-90 capsule, R-3Normal  3. Migraine without aura and without status migrainosus, not intractable  -     SUMAtriptan (IMITREX) 100 MG tablet; Take one at onset of HA, may repeat in 2hrs if no better. Max 2tabs/24hrs. Indications: a migraine headache, Disp-27 tablet, R-3Normal  4. Encounter for screening for malignant neoplasm of prostate  -     PSA Screening  -     CBC  -     Comprehensive Metabolic Panel  -     Lipid Panel  5. Encounter for screening for lipoid disorders  -     PSA Screening  -     CBC  -     Comprehensive Metabolic Panel  -     Lipid Panel     Labs as ordered. The treatment options for BPH are discussed: observation over time with prn avoidance of alcohol/caffeine and medical treatment. Trial of tamsulosin. Pros and cons of PSA testing for prostate screening were discussed. The patient does wish to proceed. Discussed fiber supplementation/good fluid intake. Stool softeners if needed. Will continue with imitrex on prn basis as getting good relief. Further recommendations pending clinical course and workup. SUBJECTIVE/OBJECTIVE:  HPI:  Patient was recently in the emergency room due to fecal impaction. He has been having some chronic problems with constipation. They advised him at that time to increase his fluid intake which he has, however he still has some issues with constipation at times. No melena or hematochezia. Colonoscopy is up-to-date. Has also been having over the past year which he describes as some incomplete bladder emptying.   States he will

## 2022-12-17 LAB — PSA SERPL-MCNC: 1.1 NG/ML

## 2023-11-01 DIAGNOSIS — G43.009 MIGRAINE WITHOUT AURA AND WITHOUT STATUS MIGRAINOSUS, NOT INTRACTABLE: ICD-10-CM

## 2023-11-03 RX ORDER — SUMATRIPTAN 100 MG/1
TABLET, FILM COATED ORAL
Qty: 27 TABLET | Refills: 3 | Status: SHIPPED | OUTPATIENT
Start: 2023-11-03

## 2023-11-08 DIAGNOSIS — N40.1 BENIGN PROSTATIC HYPERPLASIA WITH INCOMPLETE BLADDER EMPTYING: ICD-10-CM

## 2023-11-08 DIAGNOSIS — R39.14 BENIGN PROSTATIC HYPERPLASIA WITH INCOMPLETE BLADDER EMPTYING: ICD-10-CM

## 2023-11-09 RX ORDER — TAMSULOSIN HYDROCHLORIDE 0.4 MG/1
0.4 CAPSULE ORAL DAILY
Qty: 90 CAPSULE | Refills: 1 | Status: SHIPPED | OUTPATIENT
Start: 2023-11-09

## 2024-01-10 ENCOUNTER — OFFICE VISIT (OUTPATIENT)
Dept: INTERNAL MEDICINE CLINIC | Facility: CLINIC | Age: 74
End: 2024-01-10
Payer: MEDICARE

## 2024-01-10 VITALS
RESPIRATION RATE: 16 BRPM | BODY MASS INDEX: 21 KG/M2 | SYSTOLIC BLOOD PRESSURE: 120 MMHG | OXYGEN SATURATION: 98 % | DIASTOLIC BLOOD PRESSURE: 70 MMHG | HEIGHT: 71 IN | WEIGHT: 150 LBS | HEART RATE: 52 BPM

## 2024-01-10 DIAGNOSIS — Z00.00 MEDICARE ANNUAL WELLNESS VISIT, SUBSEQUENT: Primary | ICD-10-CM

## 2024-01-10 DIAGNOSIS — E78.2 MIXED HYPERLIPIDEMIA: ICD-10-CM

## 2024-01-10 DIAGNOSIS — Z12.5 SPECIAL SCREENING FOR MALIGNANT NEOPLASM OF PROSTATE: ICD-10-CM

## 2024-01-10 DIAGNOSIS — G43.009 MIGRAINE WITHOUT AURA AND WITHOUT STATUS MIGRAINOSUS, NOT INTRACTABLE: ICD-10-CM

## 2024-01-10 DIAGNOSIS — L98.9 LESION OF SKIN OF NOSE: ICD-10-CM

## 2024-01-10 DIAGNOSIS — N40.1 BENIGN PROSTATIC HYPERPLASIA WITH INCOMPLETE BLADDER EMPTYING: ICD-10-CM

## 2024-01-10 DIAGNOSIS — R39.14 BENIGN PROSTATIC HYPERPLASIA WITH INCOMPLETE BLADDER EMPTYING: ICD-10-CM

## 2024-01-10 LAB
ALBUMIN SERPL-MCNC: 3.8 G/DL (ref 3.2–4.6)
ALBUMIN/GLOB SERPL: 1.5 (ref 0.4–1.6)
ALP SERPL-CCNC: 57 U/L (ref 50–136)
ALT SERPL-CCNC: 33 U/L (ref 12–65)
ANION GAP SERPL CALC-SCNC: 7 MMOL/L (ref 2–11)
AST SERPL-CCNC: 17 U/L (ref 15–37)
BILIRUB DIRECT SERPL-MCNC: <0.1 MG/DL
BILIRUB SERPL-MCNC: 0.4 MG/DL (ref 0.2–1.1)
BUN SERPL-MCNC: 25 MG/DL (ref 8–23)
CALCIUM SERPL-MCNC: 9.5 MG/DL (ref 8.3–10.4)
CHLORIDE SERPL-SCNC: 107 MMOL/L (ref 103–113)
CHOLEST SERPL-MCNC: 163 MG/DL
CO2 SERPL-SCNC: 27 MMOL/L (ref 21–32)
CREAT SERPL-MCNC: 0.9 MG/DL (ref 0.8–1.5)
ERYTHROCYTE [DISTWIDTH] IN BLOOD BY AUTOMATED COUNT: 13.1 % (ref 11.9–14.6)
GLOBULIN SER CALC-MCNC: 2.6 G/DL (ref 2.8–4.5)
GLUCOSE SERPL-MCNC: 101 MG/DL (ref 65–100)
HCT VFR BLD AUTO: 44.6 % (ref 41.1–50.3)
HDLC SERPL-MCNC: 47 MG/DL (ref 40–60)
HDLC SERPL: 3.5
HGB BLD-MCNC: 14.9 G/DL (ref 13.6–17.2)
LDLC SERPL CALC-MCNC: 103.8 MG/DL
MCH RBC QN AUTO: 30.1 PG (ref 26.1–32.9)
MCHC RBC AUTO-ENTMCNC: 33.4 G/DL (ref 31.4–35)
MCV RBC AUTO: 90.1 FL (ref 82–102)
NRBC # BLD: 0 K/UL (ref 0–0.2)
PLATELET # BLD AUTO: 261 K/UL (ref 150–450)
PMV BLD AUTO: 9.2 FL (ref 9.4–12.3)
POTASSIUM SERPL-SCNC: 4.3 MMOL/L (ref 3.5–5.1)
PROT SERPL-MCNC: 6.4 G/DL (ref 6.3–8.2)
PSA SERPL-MCNC: 1.6 NG/ML
RBC # BLD AUTO: 4.95 M/UL (ref 4.23–5.6)
SODIUM SERPL-SCNC: 141 MMOL/L (ref 136–146)
TRIGL SERPL-MCNC: 61 MG/DL (ref 35–150)
VLDLC SERPL CALC-MCNC: 12.2 MG/DL (ref 6–23)
WBC # BLD AUTO: 7.2 K/UL (ref 4.3–11.1)

## 2024-01-10 PROCEDURE — G8427 DOCREV CUR MEDS BY ELIG CLIN: HCPCS | Performed by: FAMILY MEDICINE

## 2024-01-10 PROCEDURE — 1123F ACP DISCUSS/DSCN MKR DOCD: CPT | Performed by: FAMILY MEDICINE

## 2024-01-10 PROCEDURE — G8420 CALC BMI NORM PARAMETERS: HCPCS | Performed by: FAMILY MEDICINE

## 2024-01-10 PROCEDURE — G8484 FLU IMMUNIZE NO ADMIN: HCPCS | Performed by: FAMILY MEDICINE

## 2024-01-10 PROCEDURE — 99214 OFFICE O/P EST MOD 30 MIN: CPT | Performed by: FAMILY MEDICINE

## 2024-01-10 PROCEDURE — G0439 PPPS, SUBSEQ VISIT: HCPCS | Performed by: FAMILY MEDICINE

## 2024-01-10 PROCEDURE — 1036F TOBACCO NON-USER: CPT | Performed by: FAMILY MEDICINE

## 2024-01-10 PROCEDURE — 3017F COLORECTAL CA SCREEN DOC REV: CPT | Performed by: FAMILY MEDICINE

## 2024-01-10 SDOH — ECONOMIC STABILITY: FOOD INSECURITY: WITHIN THE PAST 12 MONTHS, THE FOOD YOU BOUGHT JUST DIDN'T LAST AND YOU DIDN'T HAVE MONEY TO GET MORE.: PATIENT DECLINED

## 2024-01-10 SDOH — ECONOMIC STABILITY: FOOD INSECURITY: WITHIN THE PAST 12 MONTHS, YOU WORRIED THAT YOUR FOOD WOULD RUN OUT BEFORE YOU GOT MONEY TO BUY MORE.: PATIENT DECLINED

## 2024-01-10 SDOH — ECONOMIC STABILITY: HOUSING INSECURITY
IN THE LAST 12 MONTHS, WAS THERE A TIME WHEN YOU DID NOT HAVE A STEADY PLACE TO SLEEP OR SLEPT IN A SHELTER (INCLUDING NOW)?: PATIENT DECLINED

## 2024-01-10 SDOH — ECONOMIC STABILITY: INCOME INSECURITY: HOW HARD IS IT FOR YOU TO PAY FOR THE VERY BASICS LIKE FOOD, HOUSING, MEDICAL CARE, AND HEATING?: PATIENT DECLINED

## 2024-01-10 ASSESSMENT — PATIENT HEALTH QUESTIONNAIRE - PHQ9
1. LITTLE INTEREST OR PLEASURE IN DOING THINGS: 0
SUM OF ALL RESPONSES TO PHQ QUESTIONS 1-9: 0
2. FEELING DOWN, DEPRESSED OR HOPELESS: 0
SUM OF ALL RESPONSES TO PHQ QUESTIONS 1-9: 0
SUM OF ALL RESPONSES TO PHQ9 QUESTIONS 1 & 2: 0

## 2024-01-10 ASSESSMENT — LIFESTYLE VARIABLES
HOW MANY STANDARD DRINKS CONTAINING ALCOHOL DO YOU HAVE ON A TYPICAL DAY: PATIENT DOES NOT DRINK
HOW OFTEN DO YOU HAVE A DRINK CONTAINING ALCOHOL: NEVER

## 2024-01-10 NOTE — PROGRESS NOTES
He is alert.            No Known Allergies  Prior to Visit Medications    Medication Sig Taking? Authorizing Provider   tamsulosin (FLOMAX) 0.4 MG capsule TAKE 1 CAPSULE EVERY DAY Yes Jaycob Nava DO   SUMAtriptan (IMITREX) 100 MG tablet TAKE 1 TABLET AT ONSET OF MIGRAINE HEADACHE, MAY REPEAT IN 2 HRS IF NO BETTER. MAX 2 TABS/24 HRS. Yes Jaycob Nava DO       CareTeam (Including outside providers/suppliers regularly involved in providing care):   Patient Care Team:  Jaycob Nava DO as PCP - General  Jaycob Nava DO as PCP - Empaneled Provider     Reviewed and updated this visit:  Tobacco  Allergies  Meds  Problems  Med Hx  Surg Hx  Soc Hx  Fam Hx

## 2024-01-10 NOTE — PATIENT INSTRUCTIONS
foods.     Read food labels and try to avoid saturated and trans fats. They increase your risk of heart disease by raising cholesterol levels.     Limit the amount of solid fat-butter, margarine, and shortening-you eat. Use olive, peanut, or canola oil when you cook. Bake, broil, and steam foods instead of frying them.     Eat a variety of fruit and vegetables every day. Dark green, deep orange, red, or yellow fruits and vegetables are especially good for you. Examples include spinach, carrots, peaches, and berries.     Foods high in fiber can reduce your cholesterol and provide important vitamins and minerals. High-fiber foods include whole-grain cereals and breads, oatmeal, beans, brown rice, citrus fruits, and apples.     Eat lean proteins. Heart-healthy proteins include seafood, lean meats and poultry, eggs, beans, peas, nuts, seeds, and soy products.     Limit drinks and foods with added sugar. These include candy, desserts, and soda pop.   Lifestyle changes    If your doctor recommends it, get more exercise. Walking is a good choice. Bit by bit, increase the amount you walk every day. Try for at least 30 minutes on most days of the week. You also may want to swim, bike, or do other activities.     Do not smoke. If you need help quitting, talk to your doctor about stop-smoking programs and medicines. These can increase your chances of quitting for good. Quitting smoking may be the most important step you can take to protect your heart. It is never too late to quit.     Limit alcohol to 2 drinks a day for men and 1 drink a day for women. Too much alcohol can cause health problems.     Manage other health problems such as diabetes, high blood pressure, and high cholesterol. If you think you may have a problem with alcohol or drug use, talk to your doctor.   Medicines    Take your medicines exactly as prescribed. Call your doctor if you think you are having a problem with your medicine.     If your doctor

## 2024-07-08 DIAGNOSIS — N40.1 BENIGN PROSTATIC HYPERPLASIA WITH INCOMPLETE BLADDER EMPTYING: ICD-10-CM

## 2024-07-08 DIAGNOSIS — R39.14 BENIGN PROSTATIC HYPERPLASIA WITH INCOMPLETE BLADDER EMPTYING: ICD-10-CM

## 2024-07-10 RX ORDER — TAMSULOSIN HYDROCHLORIDE 0.4 MG/1
0.4 CAPSULE ORAL DAILY
Qty: 90 CAPSULE | Refills: 3 | Status: SHIPPED | OUTPATIENT
Start: 2024-07-10

## 2024-08-21 DIAGNOSIS — G43.009 MIGRAINE WITHOUT AURA AND WITHOUT STATUS MIGRAINOSUS, NOT INTRACTABLE: ICD-10-CM

## 2024-08-22 RX ORDER — SUMATRIPTAN 100 MG/1
TABLET, FILM COATED ORAL
Qty: 27 TABLET | Refills: 3 | Status: SHIPPED | OUTPATIENT
Start: 2024-08-22

## 2025-01-13 ENCOUNTER — OFFICE VISIT (OUTPATIENT)
Dept: INTERNAL MEDICINE CLINIC | Facility: CLINIC | Age: 75
End: 2025-01-13

## 2025-01-13 VITALS
DIASTOLIC BLOOD PRESSURE: 80 MMHG | HEART RATE: 60 BPM | WEIGHT: 157 LBS | BODY MASS INDEX: 21.98 KG/M2 | SYSTOLIC BLOOD PRESSURE: 128 MMHG | HEIGHT: 71 IN | OXYGEN SATURATION: 98 %

## 2025-01-13 DIAGNOSIS — R10.9 ABDOMINAL CRAMPING: ICD-10-CM

## 2025-01-13 DIAGNOSIS — N40.1 BENIGN PROSTATIC HYPERPLASIA WITH INCOMPLETE BLADDER EMPTYING: ICD-10-CM

## 2025-01-13 DIAGNOSIS — Z12.5 PROSTATE CANCER SCREENING: ICD-10-CM

## 2025-01-13 DIAGNOSIS — Z00.00 MEDICARE ANNUAL WELLNESS VISIT, SUBSEQUENT: Primary | ICD-10-CM

## 2025-01-13 DIAGNOSIS — G43.009 MIGRAINE WITHOUT AURA AND WITHOUT STATUS MIGRAINOSUS, NOT INTRACTABLE: ICD-10-CM

## 2025-01-13 DIAGNOSIS — R39.14 BENIGN PROSTATIC HYPERPLASIA WITH INCOMPLETE BLADDER EMPTYING: ICD-10-CM

## 2025-01-13 DIAGNOSIS — E78.2 MIXED HYPERLIPIDEMIA: ICD-10-CM

## 2025-01-13 LAB
ALBUMIN SERPL-MCNC: 3.8 G/DL (ref 3.2–4.6)
ALBUMIN/GLOB SERPL: 1.5 (ref 1–1.9)
ALP SERPL-CCNC: 55 U/L (ref 40–129)
ALT SERPL-CCNC: 33 U/L (ref 8–55)
ANION GAP SERPL CALC-SCNC: 10 MMOL/L (ref 7–16)
AST SERPL-CCNC: 25 U/L (ref 15–37)
BILIRUB DIRECT SERPL-MCNC: <0.2 MG/DL (ref 0–0.4)
BILIRUB SERPL-MCNC: 0.4 MG/DL (ref 0–1.2)
BUN SERPL-MCNC: 20 MG/DL (ref 8–23)
CALCIUM SERPL-MCNC: 9.8 MG/DL (ref 8.8–10.2)
CHLORIDE SERPL-SCNC: 104 MMOL/L (ref 98–107)
CHOLEST SERPL-MCNC: 170 MG/DL (ref 0–200)
CO2 SERPL-SCNC: 26 MMOL/L (ref 20–29)
CREAT SERPL-MCNC: 1.12 MG/DL (ref 0.8–1.3)
ERYTHROCYTE [DISTWIDTH] IN BLOOD BY AUTOMATED COUNT: 13.2 % (ref 11.9–14.6)
GLOBULIN SER CALC-MCNC: 2.6 G/DL (ref 2.3–3.5)
GLUCOSE SERPL-MCNC: 102 MG/DL (ref 70–99)
HCT VFR BLD AUTO: 45.2 % (ref 41.1–50.3)
HDLC SERPL-MCNC: 45 MG/DL (ref 40–60)
HDLC SERPL: 3.7 (ref 0–5)
HGB BLD-MCNC: 14.9 G/DL (ref 13.6–17.2)
LDLC SERPL CALC-MCNC: 96 MG/DL (ref 0–100)
MCH RBC QN AUTO: 29.9 PG (ref 26.1–32.9)
MCHC RBC AUTO-ENTMCNC: 33 G/DL (ref 31.4–35)
MCV RBC AUTO: 90.8 FL (ref 82–102)
NRBC # BLD: 0 K/UL (ref 0–0.2)
PLATELET # BLD AUTO: 251 K/UL (ref 150–450)
PMV BLD AUTO: 9.3 FL (ref 9.4–12.3)
POTASSIUM SERPL-SCNC: 4.6 MMOL/L (ref 3.5–5.1)
PROT SERPL-MCNC: 6.4 G/DL (ref 6.3–8.2)
PSA SERPL-MCNC: 1.7 NG/ML (ref 0–4)
RBC # BLD AUTO: 4.98 M/UL (ref 4.23–5.6)
SODIUM SERPL-SCNC: 140 MMOL/L (ref 136–145)
TRIGL SERPL-MCNC: 141 MG/DL (ref 0–150)
VLDLC SERPL CALC-MCNC: 28 MG/DL (ref 6–23)
WBC # BLD AUTO: 7.7 K/UL (ref 4.3–11.1)

## 2025-01-13 SDOH — ECONOMIC STABILITY: FOOD INSECURITY: WITHIN THE PAST 12 MONTHS, THE FOOD YOU BOUGHT JUST DIDN'T LAST AND YOU DIDN'T HAVE MONEY TO GET MORE.: NEVER TRUE

## 2025-01-13 SDOH — ECONOMIC STABILITY: FOOD INSECURITY: WITHIN THE PAST 12 MONTHS, YOU WORRIED THAT YOUR FOOD WOULD RUN OUT BEFORE YOU GOT MONEY TO BUY MORE.: NEVER TRUE

## 2025-01-13 ASSESSMENT — PATIENT HEALTH QUESTIONNAIRE - PHQ9
SUM OF ALL RESPONSES TO PHQ QUESTIONS 1-9: 0
2. FEELING DOWN, DEPRESSED OR HOPELESS: NOT AT ALL
SUM OF ALL RESPONSES TO PHQ QUESTIONS 1-9: 0
1. LITTLE INTEREST OR PLEASURE IN DOING THINGS: NOT AT ALL
SUM OF ALL RESPONSES TO PHQ9 QUESTIONS 1 & 2: 0
SUM OF ALL RESPONSES TO PHQ QUESTIONS 1-9: 0
SUM OF ALL RESPONSES TO PHQ QUESTIONS 1-9: 0

## 2025-01-13 NOTE — PROGRESS NOTES
Medicare Annual Wellness Visit    Jase Sunshine Thomas is here for Medicare AW    Assessment & Plan   Medicare annual wellness visit, subsequent  Migraine without aura and without status migrainosus, not intractable  Benign prostatic hyperplasia with incomplete bladder emptying  Mixed hyperlipidemia  -     Basic Metabolic Panel; Future  -     Hepatic Function Panel; Future  -     Lipid Panel; Future  -     CBC; Future  Prostate cancer screening  -     PSA Screening; Future  Abdominal cramping     Return in 1 year (on 1/13/2026) for Medicare AWV.  -Will continue with imtrex on prn basis.  -Continue tamsulosin as tolerating and providing good benefit.  -Recheck lipids.  \-Pros and cons of PSA testing for prostate screening were discussed.  The patient does wish to proceed.  -Will continue to monitor abdominal cramping.  If it recurs, further workup and investigation as warranted.         Subjective   The following acute and/or chronic problems were also addressed today:  Has been doing well with migraines.  Only gets them very infrequently does take triptan therapy and gets good relief.  Doing well with BPH.  Feels tamsulosin is effective.  He did actually miss doses for about a week because of travel and noticed a difference.  States flow is good when he is on medication.  Has had 2 episodes in the past year of having some mid abdominal cramping after eating his evening meal.  States both episodes he took Pepto-Bismol and it relieved.  He has not had any further symptoms.  No symptoms of epigastric reflux.  No stool changes.  ROS negative except as noted above today.  Patient's complete Health Risk Assessment and screening values have been reviewed and are found in Flowsheets. The following problems were reviewed today and where indicated follow up appointments were made and/or referrals ordered.    Positive Risk Factor Screenings with Interventions:              Inactivity:  On average, how many days per week do you

## 2025-01-13 NOTE — PATIENT INSTRUCTIONS
Learning About Being Active as an Older Adult  Why is being active important as you get older?     Being active is one of the best things you can do for your health. And it's never too late to start. Being active--or getting active, if you aren't already--has definite benefits. It can:  Give you more energy,  Keep your mind sharp.  Improve balance to reduce your risk of falls.  Help you manage chronic illness with fewer medicines.  No matter how old you are, how fit you are, or what health problems you have, there is a form of activity that will work for you. And the more physical activity you can do, the better your overall health will be.  What kinds of activity can help you stay healthy?  Being more active will make your daily activities easier. Physical activity includes planned exercise and things you do in daily life. There are four types of activity:  Aerobic.  Doing aerobic activity makes your heart and lungs strong.  Includes walking, dancing, and gardening.  Aim for at least 2½ hours spread throughout the week.  It improves your energy and can help you sleep better.  Muscle-strengthening.  This type of activity can help maintain muscle and strengthen bones.  Includes climbing stairs, using resistance bands, and lifting or carrying heavy loads.  Aim for at least twice a week.  It can help protect the knees and other joints.  Stretching.  Stretching gives you better range of motion in joints and muscles.  Includes upper arm stretches, calf stretches, and gentle yoga.  Aim for at least twice a week, preferably after your muscles are warmed up from other activities.  It can help you function better in daily life.  Balancing.  This helps you stay coordinated and have good posture.  Includes heel-to-toe walking, sherlyn chi, and certain types of yoga.  Aim for at least 3 days a week.  It can reduce your risk of falling.  Even if you have a hard time meeting the recommendations, it's better to be more active

## 2025-06-11 DIAGNOSIS — G43.009 MIGRAINE WITHOUT AURA AND WITHOUT STATUS MIGRAINOSUS, NOT INTRACTABLE: ICD-10-CM

## 2025-06-12 RX ORDER — SUMATRIPTAN SUCCINATE 100 MG/1
TABLET ORAL
Qty: 27 TABLET | Refills: 3 | Status: SHIPPED | OUTPATIENT
Start: 2025-06-12

## 2025-07-01 DIAGNOSIS — N40.1 BENIGN PROSTATIC HYPERPLASIA WITH INCOMPLETE BLADDER EMPTYING: ICD-10-CM

## 2025-07-01 DIAGNOSIS — R39.14 BENIGN PROSTATIC HYPERPLASIA WITH INCOMPLETE BLADDER EMPTYING: ICD-10-CM

## 2025-07-03 RX ORDER — TAMSULOSIN HYDROCHLORIDE 0.4 MG/1
0.4 CAPSULE ORAL DAILY
Qty: 90 CAPSULE | Refills: 3 | Status: SHIPPED | OUTPATIENT
Start: 2025-07-03

## (undated) DEVICE — SUTURE VCRL SZ 0 L27IN ABSRB UD L26MM CT-2 1/2 CIR J270H

## (undated) DEVICE — PENROSE DRAIN 12" X 1/4: Brand: CARDINAL HEALTH

## (undated) DEVICE — SUTURE PERMAHAND SZ 2-0 L12X18IN NONABSORBABLE BLK SILK A185H

## (undated) DEVICE — 2000CC GUARDIAN II: Brand: GUARDIAN

## (undated) DEVICE — BLADE SURG NO15 S STL STR DISP GLASSVAN

## (undated) DEVICE — (D)PREP SKN CHLRAPRP APPL 26ML -- CONVERT TO ITEM 371833

## (undated) DEVICE — REM POLYHESIVE ADULT PATIENT RETURN ELECTRODE: Brand: VALLEYLAB

## (undated) DEVICE — SOLUTION IV 1000ML 0.9% SOD CHL

## (undated) DEVICE — SURGICAL PROCEDURE PACK BASIC ST FRANCIS

## (undated) DEVICE — SHEET, DRAPE, SPLIT, STERILE: Brand: MEDLINE

## (undated) DEVICE — BUTTON SWITCH PENCIL BLADE ELECTRODE, HOLSTER: Brand: EDGE

## (undated) DEVICE — SLIM BODY SKIN STAPLER: Brand: APPOSE ULC

## (undated) DEVICE — DRAPE,TOP,102X53,STERILE: Brand: MEDLINE

## (undated) DEVICE — INTENDED FOR TISSUE SEPARATION, AND OTHER PROCEDURES THAT REQUIRE A SHARP SURGICAL BLADE TO PUNCTURE OR CUT.: Brand: BARD-PARKER ® STAINLESS STEEL BLADES

## (undated) DEVICE — NEEDLE HYPO 25GA L1.5IN BLU POLYPR HUB S STL REG BVL STR

## (undated) DEVICE — SUTURE VCRL SZ 3-0 L27IN ABSRB UD L26MM SH 1/2 CIR J416H

## (undated) DEVICE — SUTURE PROL SZ 0 L30IN NONABSORBABLE BLU L26MM CT-2 1/2 CIR 8412H

## (undated) DEVICE — KENDALL SCD EXPRESS SLEEVES, KNEE LENGTH, LARGE: Brand: KENDALL SCD